# Patient Record
Sex: FEMALE | Race: OTHER | HISPANIC OR LATINO | URBAN - METROPOLITAN AREA
[De-identification: names, ages, dates, MRNs, and addresses within clinical notes are randomized per-mention and may not be internally consistent; named-entity substitution may affect disease eponyms.]

---

## 2018-10-12 ENCOUNTER — EMERGENCY (EMERGENCY)
Facility: HOSPITAL | Age: 18
LOS: 1 days | Discharge: HOME | End: 2018-10-12
Admitting: EMERGENCY MEDICINE
Payer: OTHER GOVERNMENT

## 2018-10-12 VITALS
HEART RATE: 93 BPM | OXYGEN SATURATION: 99 % | WEIGHT: 115.08 LBS | DIASTOLIC BLOOD PRESSURE: 93 MMHG | RESPIRATION RATE: 16 BRPM | SYSTOLIC BLOOD PRESSURE: 130 MMHG | TEMPERATURE: 99 F

## 2018-10-12 DIAGNOSIS — R59.1 GENERALIZED ENLARGED LYMPH NODES: ICD-10-CM

## 2018-10-12 PROCEDURE — 99282 EMERGENCY DEPT VISIT SF MDM: CPT

## 2018-10-12 NOTE — ED PROVIDER NOTE - CARE PROVIDERS DIRECT ADDRESSES
[email or web address]

## 2018-10-12 NOTE — ED ADULT TRIAGE NOTE - CHIEF COMPLAINT QUOTE
Pt with tender swollen nodes since waking up this morning. C/O generalized fatigue for several days.

## 2018-10-12 NOTE — ED PROVIDER NOTE - OBJECTIVE STATEMENT
patient PMHx anemia, IUD presents with painful swollen glands x 1 day. admits to cough and sorethroat earlier in the week

## 2018-10-12 NOTE — ED PROVIDER NOTE - CARE PROVIDER_API CALL
Monisha Feldman), Internal Medicine  [address] [address]  Phone: [phone]  Fax: [phone]    Kathy Card), [address] [address]  Phone: [phone]  Fax: [phone]

## 2019-04-01 ENCOUNTER — EMERGENCY (EMERGENCY)
Facility: HOSPITAL | Age: 19
LOS: 1 days | Discharge: HOME | End: 2019-04-01
Admitting: EMERGENCY MEDICINE
Payer: OTHER GOVERNMENT

## 2019-04-01 VITALS
SYSTOLIC BLOOD PRESSURE: 130 MMHG | TEMPERATURE: 99 F | RESPIRATION RATE: 16 BRPM | DIASTOLIC BLOOD PRESSURE: 90 MMHG | OXYGEN SATURATION: 100 % | HEART RATE: 90 BPM
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
## 2019-04-01 DIAGNOSIS — M25.562 PAIN IN LEFT KNEE: ICD-10-CM

## 2019-04-01 DIAGNOSIS — S80.02XA CONTUSION OF LEFT KNEE, INITIAL ENCOUNTER: ICD-10-CM
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
## 2019-04-01 PROCEDURE — 99283 EMERGENCY DEPT VISIT LOW MDM: CPT

## 2019-04-01 PROCEDURE — 73562 X-RAY EXAM OF KNEE 3: CPT | Mod: 26,LT

## 2019-04-01 RX ORDER — ACETAMINOPHEN 500 MG
650 TABLET ORAL ONCE
Qty: 0 | Refills: 0 | Status: COMPLETED | OUTPATIENT
Start: 2019-04-01 | End: 2019-04-01

## 2019-04-01 RX ADMIN — Medication 650 MILLIGRAM(S): at 13:13

## 2019-04-01 NOTE — ED PROVIDER NOTE - NSFOLLOWUPINSTRUCTIONS_ED_ALL_ED_FT
RICE:  REST, ICE, COMPRESS, ELEVATE  Apply ice for 20 minutes 4-5 times per day.  Do not apply ice directly on skin.   Return for weakness, recurrent fall or other concerns.

## 2019-04-01 NOTE — ED PROVIDER NOTE - CLINICAL SUMMARY MEDICAL DECISION MAKING FREE TEXT BOX
19 y/o F presents to ED c/o L knee pain s/p mechanical fall.  xray wet read- no fracture, + effusion.  Pt advised to RICE and wear ace bandage for support.  Pt able to bear weight

## 2019-04-01 NOTE — ED ADULT NURSE NOTE - CHPI ED NUR SYMPTOMS NEG
no difficulty bearing weight/no fever/no stiffness/no deformity/no numbness/no tingling/no weakness/no back pain

## 2019-04-01 NOTE — ED PROVIDER NOTE - CARE PROVIDER_API CALL
Dejon Earl)  Orthopaedic Surgery  [address]  [place], [place] [address]  Phone: [phone]  Fax: [phone]  Follow Up Time:

## 2019-04-01 NOTE — ED PROVIDER NOTE - OBJECTIVE STATEMENT
19 y/o F presents to ED c/o R knee pain s/p tripping and striking knee on edge of metal step on subway step.  Pt able to ambulate.  She localizes the pain just above the knee. 17 y/o F presents to ED c/o R knee pain s/p tripping and striking area just above knee cap on edge of metal step on subway step.  Pt able to ambulate.  She localizes the pain just above the knee.

## 2019-04-01 NOTE — ED ADULT NURSE NOTE - OBJECTIVE STATEMENT
pt is a 18 y/ female presents to the ED s/p mechanical trip and fall up subway step sustaining injury to right knee. pt in NAD, appears comfortable and will continue to monitor.

## 2022-08-25 NOTE — ED PROVIDER NOTE - CROS ED CONS ALL NEG
Problem: Discharge Planning  Goal: Discharge to home or other facility with appropriate resources  Outcome: Progressing  Flowsheets (Taken 8/25/2022 1221)  Discharge to home or other facility with appropriate resources:   Identify barriers to discharge with patient and caregiver   Identify discharge learning needs (meds, wound care, etc)   Refer to discharge planning if patient needs post-hospital services based on physician order or complex needs related to functional status, cognitive ability or social support system   Arrange for needed discharge resources and transportation as appropriate  Note: Daily education about self care at home. Problem: Safety - Adult  Goal: Free from fall injury  Outcome: Progressing  Flowsheets (Taken 8/25/2022 1221)  Free From Fall Injury:   Instruct family/caregiver on patient safety   Based on caregiver fall risk screen, instruct family/caregiver to ask for assistance with transferring infant if caregiver noted to have fall risk factors  Note: Remains free from injury. Problem: ABCDS Injury Assessment  Goal: Absence of physical injury  Outcome: Progressing  Flowsheets (Taken 8/25/2022 1221)  Absence of Physical Injury: Implement safety measures based on patient assessment  Note: Remind pt to call out for any assistance. Call light in reach at all times, bed in low position, bed exit alarm on. W/C locked, pull away alarm on. Pt calls out appropriately. Remains free from injury. Problem: Skin/Tissue Integrity  Goal: Absence of new skin breakdown  Description: 1. Monitor for areas of redness and/or skin breakdown  2. Assess vascular access sites hourly  3. Every 4-6 hours minimum:  Change oxygen saturation probe site  4. Every 4-6 hours:  If on nasal continuous positive airway pressure, respiratory therapy assess nares and determine need for appliance change or resting period. Outcome: Progressing  Note: Assess and document any skin issues every shift.  Encourage pt to wear stump  and hard shell over right stump as ordered. Assess stump incision every shift, see LDAs. Problem: Chronic Conditions and Co-morbidities  Goal: Patient's chronic conditions and co-morbidity symptoms are monitored and maintained or improved  Outcome: Progressing  Flowsheets (Taken 8/25/2022 1221)  Care Plan - Patient's Chronic Conditions and Co-Morbidity Symptoms are Monitored and Maintained or Improved:   Monitor and assess patient's chronic conditions and comorbid symptoms for stability, deterioration, or improvement   Collaborate with multidisciplinary team to address chronic and comorbid conditions and prevent exacerbation or deterioration   Update acute care plan with appropriate goals if chronic or comorbid symptoms are exacerbated and prevent overall improvement and discharge     Problem: Nutrition Deficit:  Goal: Optimize nutritional status  Outcome: Progressing  Flowsheets (Taken 8/25/2022 1221)  Nutrient intake appropriate for improving, restoring, or maintaining nutritional needs:   Assess nutritional status and recommend course of action   Recommend appropriate diets, oral nutritional supplements, and vitamin/mineral supplements   Provide specific nutrition education to patient or family as appropriate   Monitor oral intake, labs, and treatment plans  Note: Encourage protein intake to help promote healing. Pt drinks Ensure daily.      Problem: Pain  Goal: Verbalizes/displays adequate comfort level or baseline comfort level  Outcome: Progressing  Flowsheets (Taken 8/25/2022 1221)  Verbalizes/displays adequate comfort level or baseline comfort level:   Encourage patient to monitor pain and request assistance   Assess pain using appropriate pain scale   Administer analgesics based on type and severity of pain and evaluate response   Implement non-pharmacological measures as appropriate and evaluate response   Consider cultural and social influences on pain and pain management  Note: Medicate for pain as needed as ordered. negative...

## 2025-04-02 NOTE — ED ADULT NURSE NOTE - CAS TRG GENERAL AIRWAY, MLM
Registered Dietitian Nutrition Progress Report    Reason for Visit: Pt here for Nutrition Education for diagnosis of   Diagnosis Information        Diagnosis    E11.9 (ICD-10-CM) - Type 2 diabetes mellitus without complication, without long-term current use of insulin (CMD)                  Highlights of today's visit:  Patient seen via telehealth with diagnosis of Type 2 DM. Patient wanted to primarily focus on her difficulties with weight loss. She does report chk her BG at least 1 time per day (fasting or after dinner meal (avg between 90 to 120mg/dL). She is taking Metformin. Current A1c is 6%  She does report losing 100# several years ago doing intermittent fasting primarily. She reports over the last 4 years she has only been able to loose about 10#.    She reports wanting to loose weight to decrease joint pain and concerns for osteoporosis.    Brief review of diet history appears to indicate significantly limited diet with only 1x meal per day. Some processed meats/ deli meats high in salt; limited nutrient intake with limited variety of vegetables and limited protein sources; and possibly insufficient water intake.  No set activity/ exercise routine at this time.  She is on her feet working about 6 hours per day.  Reviewed choosing foods based on Diabetes Myplate; Mediterranean diet guidelines and heart health nutrition guidelines. Reviewed how to read food labels.  She plans on making some changes; work toward increasing water, lean protein, veggie & fruit intake and adding exercise/ activity to her routine.     Plans to follow up in  2 1/2 months    The patient was seen from 1208 to 1243 and billed for 30 minutes.  Order located in the patient's computer chart.  Relevant medical history reviewed.    The instruction was given to the patient  This visit is being performed virtually via Video visit using [employer].   Clinical Location: [facility]  Patient's location Home and is  physically present in   the [facility] at the time of this visit.        Self-Reported Client History:   Personal hx, medical/health family hx, treatments, including complementary/alternative, & social hx:  Prior weight loss attempts: intermittent fasting  Barriers to weight loss: none; limited physical activity  See EMR  History of Type 2 DM; Arthritis; HTN; HLD; Sleep apnea; GERD; Ostroarthritis    Anthropometric Measurements:  Pt's BMI was 36.13 and now it is Estimated body mass index is 36.13 kg/m² as calculated from the following:    Height as of 3/26/25: 5' (1.524 m).    Weight as of 3/26/25: 83.9 kg.   Wt Readings from Last 4 Encounters:   03/26/25 83.9 kg   02/12/25 88.9 kg   01/21/25 88 kg   12/10/24 90.3 kg     Goal body weight: 160#  Last at that weight: n/a  Highest body weight: 285#    Labs:   See EMR    Testing blood sugars 1 x/ day before bed or fasting  90 to 120    Supplements/Medications:  See EMR Metformin; statin;   Potassium  Ca ; Mg  Vit C; B12;     Physical Activity:  Activities of daily living only  Pt currently does work on her feet, standing/ walking about 6 hours per day as physical activity.  Encouraged patient to increase activity slowly over time to meet ADA recommendations of 30 minutes a day, 5-7 days per week.  Standing or walking 6 hours per day    Food and Nutrition Related History  Type of food/meals: Primarily x1 meal per day. She does her own cooking. Limited variety  Meal/Snack pattern: 1 meal (possibly some grazing snacks)   Dining Out: n/a  Waking around 5:30AM  - limited water intake    Gave up sweets habits  Vegetable 3 x/ wk - lettuce; grn beans; carrots  Fruit - berries (rasp / black berries)      Meal  Content   1st meal Time: 0630  Food choice: no food  Beverage: Black Coffee   2nd Meal Time:   Food choice:   Beverage: Diet Cranberry    3rd Meal Time: 1800  Food choice: Ham & cheese sand on rye with mustard  Chicken hot dish with noodles  Beverage: Diet Pepsi    Snacks/Desserts AM: [employer] Diet Cranberry juice- 2 cups  PM:   Evening: none     Oral Fluids Soda: Diet pepsi  Juice:Diet Cranberry juice  Sweetened beverages:  Coffee:black  ETOH: n/a  Water:  1 to 2  16ozwater       Other:  Sleeps good    Nutrition Assessment:  Inconsistent carbohydrate intake.  Irregular meal distribution and intake  Skipped meals/snacks  Inappropriate food/beverage choices   Inadequate fluid intake  Inadequate fiber intake  Inadequate protein intake     Nutrition Diagnosis:  Food and nutrition knowledge deficit related to excessive caloric intake as evidenced by self-reported diet recall    Obesity related to excessive caloric intake and limited/no physical activity as evidenced by self-reported diet recall and BMI: 36    Nutrition Prescription:   Mediterranean Style Diet: Emphasis on vegetables, fruits, and whole grains; includes low-fat dairy products, poultry, fish, legumes, non-tropical vegetable oils and nuts; and limits intake of sweets, sweetened beverages, and red meats.  Estimated calorie needs are 1550 kcals/day; adjusted to 1200 to 1300 kcals/day to obtain negative energy balance.   30 to 45 grams Carbohydrates per meal  7 to 8 ounces protein per day    Interventions:  Comprehensive Nutrition Education focused on skill development of identification of food groups, standard portions from each food group, label reading and recommended modifications in current eating habits  Nutrition counseling based on Cognitive behavioral theory using self-monitoring to goal you selected is:   1. Work toward choosing lean protein sources with goal of at least 50 to 60 grams (about 7 to 8oz cooked protein) per day  2. Increase variety of non-starchy vegetables with goal 2 to 3 servings daily (1 serving= 1/2c cooked or 1 c raw)  3. Increase exercise as able (goal 150 minutes/ week)    Monitoring/Evaluation:  Verbalized readiness to change nutrition related behaviors - goal you selected is:   1.  Work toward choosing lean protein sources with goal of at least 50 to 60 grams (about 7 to 8oz cooked protein) per day  2. Increase variety of non-starchy vegetables with goal 2 to 3 servings daily (1 serving= 1/2c cooked or 1 c raw)  3. Increase exercise as able (goal 150 minutes/ week)  Plan to evaluate weight change of 1-2 pounds/week until next visit  Plan to evaluate food intake (number of food groups servings) per nutrition prescription through self reported adherence score of 75%    Plan to evaluate ability to engage in physical activity of at least 150 minutes per week  Plan to evaluate pt’s ability to follow prescribed meal/snack pattern through self-reported adherence score of 75%    Resources Issued:   Diabetes plate  Weight Control Guidelines FYWB [id number]  WM - Healthy Eating  WM - Overcoming Your Barriers   WM - Exercise and Activity   Healthy Plate Method   AVS  Mediterranean Diet    Plan:  Chart routed to referring Provider Siomara Prince NP  See Patient Instructions for further information     Recommended Follow-up:  Pt to follow up with nutrition education in a video call visit.  The patient was encouraged to call back with any questions or concerns.    Referring Provider: Siomara Prince NP  Billing Provider: Gricel Erickson RD, [facility]  Service Provider: Gricel Erickson RD   Patent